# Patient Record
(demographics unavailable — no encounter records)

---

## 2024-10-18 NOTE — HISTORY OF PRESENT ILLNESS
[Radiating] : radiating [Intermittent] : intermittent [Meds] : meds [] : no [FreeTextEntry5] : 75 y/o F presents for FU eval of both knees today. States previous csi helped for a few weeks until this past weekend. She is considering Gel inj today.

## 2024-10-18 NOTE — HISTORY OF PRESENT ILLNESS
[Radiating] : radiating [Intermittent] : intermittent [Meds] : meds [] : no [FreeTextEntry5] : 73 y/o F presents for FU eval of both knees today. States previous csi helped for a few weeks until this past weekend. She is considering Gel inj today.

## 2024-10-18 NOTE — ASSESSMENT
[FreeTextEntry1] : The patient is here for bilateral knee pain follow up. The patient had 6 weeks of full relief. She had pain return without new trauma. The patient has increased pain with ADLs such as walking and using stairs. She has pain with increased activity as well. She feels it has slowed down her daily movement. She denies new trauma. She denies paresthesias. The patient has pain with standing and sitting for too long. The patient wishes to consider gel injections today.  B/L knee exam: Well appearing female in no apparent distress. No rashes, scars, or abrasions. Neurovascularly intact. Tender to palpation at the medial joint lines. Right knee has a flexion contracture. Ranging from 5-120 on left knee and  on the right knee. + right knee valgus deformity and left knee varus deformity. Anterior/posterior drawer negative, - Mcmurrays. - Lachmans. Screening exam of the bilateral hips shows a full, painless ROM.  Under sterile conditions the left knee was injected with 5 cc of quarter percent plain Marcaine; 5 cc of 2% plain lidocaine and 80 mg of Depo-Medrol.  Patient tolerated the procedure well.  Under sterile conditions the right knee was injected with 5 cc of quarter percent plain Marcaine; 5 cc of 2% plain lidocaine and 80 mg of Depo-Medrol.  Patient tolerated the procedure well.  The plan at this time is ice and activity modification.  When this round of cortisone shots wear off we will start a series of Euflexxa injections.  Her sister had a biological gel in the past and had an allergy to it so she would like to do the synthetic type.  I will see her back in the office as needed.

## 2024-11-06 NOTE — PHYSICAL EXAM
[Alert] : alert [Oriented x 3] : ~L oriented x 3 [Well Nourished] : well nourished [Full Body Skin Exam Performed] : performed [FreeTextEntry3] : A full skin exam was performed including the scalp, face (including the lips, ears, nose and eyes), neck, chest, breasts, abdomen, back, buttocks, upper extremities and lower extremities.  The patient declined examination of the genitalia.   The exam revealed the following benign growths: Seborrheic keratoses - includes right pre-auricular region and right upper breast. Lentigines. Cherry angioma.

## 2024-11-06 NOTE — HISTORY OF PRESENT ILLNESS
[FreeTextEntry1] : Patient presents for skin examination. [de-identified] : Denies new, changing, bleeding or tender lesions on the skin over the past year.

## 2024-11-25 NOTE — ASSESSMENT
[FreeTextEntry1] : The patient comes in today follow-up both of her knees.  She continues to have pain consistent with her osteoarthritis.  She has trouble walking distances, doing stairs and occasionally at night.  The cortisone shots have helped but she still has pain.  Examination both lower extremities reveal normal neurovascular exam.  Examination both knees with limited range of motion from 5 to 125 degrees with varus deformity.  There is medial joint line pain and trace effusions.  There is no Bebeto's sign or instability.  There is no redness, rashes or visible scars.  Under sterile conditions the left knee was injected with 20mg /2 cc Euflexxa.  They tolerated the procedure well.  Under sterile conditions the right knee was injected with 20mg /2 cc Euflexxa.  They tolerated the procedure well.  Plan at this time is ice and activity modification we will see her back next week for second Euflexxa injection.

## 2024-12-02 NOTE — ASSESSMENT
[FreeTextEntry1] : The patient comes in today for follow-up on both of her knees.  She is here for her second Euflexxa injections.  She continues have pain walking distances, doing stairs, and sleeping at night.  The first shot is not yet kicked in.  Examination of both lower extremities reveal normal neurovascular exam.  Examination of both knees with limited range of motion 5 to 115 degrees with varus deformity.  There is medial joint line pain.  There is no Bebeto's sign or instability.  Under sterile conditions the left knee was injected with 20mg /2 cc Euflexxa.  They tolerated the procedure well.  Under sterile conditions the right knee was injected with 20mg /2 cc Euflexxa.  They tolerated the procedure well.  Plan at this time is ice and activity modification and we will see her back in the office next week for her third and final Euflexxa injection.

## 2024-12-10 NOTE — ASSESSMENT
[FreeTextEntry1] : The patient comes in today follow-up both of her knees.  She is here for her third of a series of 3 Euflexxa injections.  She is getting some improvement in her pain.  She still has some difficulty walking distances, doing stairs and occasionally at night.  She struggles going from sitting to standing.  Examination of both lower extremities reveal a normal neurovascular exam.  Examination of both knees reveal full range of motion with pain at the extremes.  There is no effusion.  There is diffuse joint line pain with no Bebeto's sign or instability.  There is no redness, rashes or visible scars.  Under sterile conditions the left knee was injected with 20mg /2 cc Euflexxa.  They tolerated the procedure well.  Under sterile conditions the right knee was injected with 20mg /2 cc Euflexxa.  They tolerated the procedure well.  The plan at this time is ice and activity modification we will see her back in the office as needed.

## 2025-02-10 NOTE — HISTORY OF PRESENT ILLNESS
[FreeTextEntry1] : 74 year old woman with HLD and HTN.  History of DVT.  She is limited by knee pain, but has no complaints of chest discomfort or dyspnea.

## 2025-02-10 NOTE — DISCUSSION/SUMMARY
[FreeTextEntry1] : Patient with HLD.  Ischemic symptoms difficult to assess due to knee pain.  Coronary calcium study ordered. [EKG obtained to assist in diagnosis and management of assessed problem(s)] : EKG obtained to assist in diagnosis and management of assessed problem(s)

## 2025-02-10 NOTE — PHYSICAL EXAM

## 2025-03-08 NOTE — ASSESSMENT
[TextEntry] : Ms. HAMILTON WERNER is a 74-year-old here for right lower extremity phlebectomies and ultrasound guided sclerotherapy

## 2025-03-08 NOTE — PROCEDURE
[FreeTextEntry1] : right lower extremity phlebectomies and ultrasound guided sclerotherapy [FreeTextEntry2] : venous insufficiency [FreeTextEntry3] : Indication:  right lower extremity varicose veins with inflammation, leg pain, leg swelling, and leg cramping.   Assistant: ANURADHA Durand      Ms. HAMILTON WERNER is a 74 year old F with a history of symptomatic right lower extremity varicose veins. A trial of compression stockings, exercise, elevation, and pain medication was attempted without relief and definitive treatment with microphlebectomy was offered.  Patient agreed to the procedure.   Chart was reviewed, including ultrasound report and operative plan. Consent was obtained from the patient, risks and benefits of the procedure were explained. Prior to the start of the procedure, the patient was examined in the standing position. The skin was marked to identify superficial varicosities. The patient was then placed on the procedure table and the entire lower extremity was prepped and a sterile field using barriers was created.   Procedure:  right lower extremity ultrasound guided (25913) sclerotherapy, multiple veins (01787), Stab phlebectomy right lower extremity (04918)   The deep varicosities in the leg were treated with ultrasound-guided sclerotherapy. Ultrasound was used to identify the extent and distribution of the deep varicosities. Multiple varicosities were accessed with a 25G needle and injected with 1.0 ml of 1.0% Sodium Tetra-decyl sulphate mixed with 9 ml of normal saline.  The solution was injected and appropriate visualization of the solution going into the vein was achieved using direct ultrasound guidance. This was repeated at multiple sites.  Every injected area was immediately compressed with gauze.  Patient was kept in Trendelenburg position for 10 minutes and instructed to perform dorsi/plantar flexion of the foot.   Then local anesthesia using tumescent (50cc 1% lidocaine, 5mg sodium bicarbonate in 500cc of normal saline) was infiltrated using a 25-gauge needle over the previously marked prominent varicose vein sites.  The marked varicose veins were then extracted using a micro-incisional avulsion technique and separate stab incisions were made with a 16G Nokor Needle along the course of the varicosities. With the help of a phlebectomy hook, the vein was fished out at each of these sites, rolled over a narrow-tipped mosquito clamp and removed. Hemostasis was secured with leg elevation and application of manual pressure. After assuring hemostasis, a sterile 4x4 was placed on the access sites and an ACE compression wrap was applied.   SIDE - RIGHT  SITE - CALF  LOCATION - LATERAL / ANTERIOR / POSTERIOR   Total Stab Incisions: 22 Estimated Blood Loss: minimal   After assuring hemostasis, a sterile 4x4 was placed on the access site and an ACE compression wrap was applied. Patient tolerated procedure well. Patient was given post-procedure instructions and follow up appointment was scheduled.

## 2025-03-08 NOTE — PROCEDURE
[FreeTextEntry1] : right lower extremity phlebectomies and ultrasound guided sclerotherapy [FreeTextEntry2] : venous insufficiency [FreeTextEntry3] : Indication:  right lower extremity varicose veins with inflammation, leg pain, leg swelling, and leg cramping.   Assistant: ANURADHA Durand      Ms. HAMILTON WERNER is a 74 year old F with a history of symptomatic right lower extremity varicose veins. A trial of compression stockings, exercise, elevation, and pain medication was attempted without relief and definitive treatment with microphlebectomy was offered.  Patient agreed to the procedure.   Chart was reviewed, including ultrasound report and operative plan. Consent was obtained from the patient, risks and benefits of the procedure were explained. Prior to the start of the procedure, the patient was examined in the standing position. The skin was marked to identify superficial varicosities. The patient was then placed on the procedure table and the entire lower extremity was prepped and a sterile field using barriers was created.   Procedure:  right lower extremity ultrasound guided (91227) sclerotherapy, multiple veins (15214), Stab phlebectomy right lower extremity (63689)   The deep varicosities in the leg were treated with ultrasound-guided sclerotherapy. Ultrasound was used to identify the extent and distribution of the deep varicosities. Multiple varicosities were accessed with a 25G needle and injected with 1.0 ml of 1.0% Sodium Tetra-decyl sulphate mixed with 9 ml of normal saline.  The solution was injected and appropriate visualization of the solution going into the vein was achieved using direct ultrasound guidance. This was repeated at multiple sites.  Every injected area was immediately compressed with gauze.  Patient was kept in Trendelenburg position for 10 minutes and instructed to perform dorsi/plantar flexion of the foot.   Then local anesthesia using tumescent (50cc 1% lidocaine, 5mg sodium bicarbonate in 500cc of normal saline) was infiltrated using a 25-gauge needle over the previously marked prominent varicose vein sites.  The marked varicose veins were then extracted using a micro-incisional avulsion technique and separate stab incisions were made with a 16G Nokor Needle along the course of the varicosities. With the help of a phlebectomy hook, the vein was fished out at each of these sites, rolled over a narrow-tipped mosquito clamp and removed. Hemostasis was secured with leg elevation and application of manual pressure. After assuring hemostasis, a sterile 4x4 was placed on the access sites and an ACE compression wrap was applied.   SIDE - RIGHT  SITE - CALF  LOCATION - LATERAL / ANTERIOR / POSTERIOR   Total Stab Incisions: 22 Estimated Blood Loss: minimal   After assuring hemostasis, a sterile 4x4 was placed on the access site and an ACE compression wrap was applied. Patient tolerated procedure well. Patient was given post-procedure instructions and follow up appointment was scheduled.

## 2025-03-08 NOTE — REASON FOR VISIT
[Procedure: _________] : a [unfilled] procedure visit [FreeTextEntry1] : right lower extremity phlebectomies and ultrasound guided sclerotherapy

## 2025-03-13 NOTE — PHYSICAL EXAM
[de-identified] : Patient is well nourished, well-developed, in no acute distress, with appropriate mood and affect. The patient is oriented to time, place, and person. Gait evaluation does reveal a limp. There is no inguinal adenopathy. The bilateral limbs are well-perfused, without skin lesions, shows a grossly normal motor and sensory examination. Knee motion is significantly reduced and does cause significant pain. The right knee moves from 0-110 degrees. The knee is stable within that range-of-motion to AP and ML stress. The alignment of the knee is 5 deg valgus. Muscle strength is normal. Pedal pulses are palpable. Hip examination was negative. The left knee moves from 0-110  degrees. The knee is stable within that range-of-motion to AP and ML stress. The alignment of the knee is 5 deg varus. Muscle strength is normal. Pedal pulses are palpable. Hip examination was negative.  [de-identified] : Long standing knee, AP knee, lateral knee, and patellar views of the bilateral knee were ordered and taken in the office and demonstrate degenerative joint disease of the knee with joint space narrowing, osteophyte formation, and subchondral sclerosis.

## 2025-03-13 NOTE — HISTORY OF PRESENT ILLNESS
[de-identified] : This is very nice 74 year-old woman experiencing bilateral knee pain, which is severe in intensity. The pain substantially limits activities of daily living. Walking tolerance is reduced. Medication including HA and cortisone injections and activity modification have been minimally effective for a period lasting greater than three months in duration. Assistive devices and external support were not deemed by the patient to be helpful in improving their function. Due to the severity of osteoarthritis and level of pain, physical therapy is contraindicated. Pain and restriction of function are intolerable at this time.

## 2025-03-13 NOTE — PHYSICAL EXAM
[de-identified] : Patient is well nourished, well-developed, in no acute distress, with appropriate mood and affect. The patient is oriented to time, place, and person. Gait evaluation does reveal a limp. There is no inguinal adenopathy. The bilateral limbs are well-perfused, without skin lesions, shows a grossly normal motor and sensory examination. Knee motion is significantly reduced and does cause significant pain. The right knee moves from 0-110 degrees. The knee is stable within that range-of-motion to AP and ML stress. The alignment of the knee is 5 deg valgus. Muscle strength is normal. Pedal pulses are palpable. Hip examination was negative. The left knee moves from 0-110  degrees. The knee is stable within that range-of-motion to AP and ML stress. The alignment of the knee is 5 deg varus. Muscle strength is normal. Pedal pulses are palpable. Hip examination was negative.  [de-identified] : Long standing knee, AP knee, lateral knee, and patellar views of the bilateral knee were ordered and taken in the office and demonstrate degenerative joint disease of the knee with joint space narrowing, osteophyte formation, and subchondral sclerosis.

## 2025-03-13 NOTE — PHYSICAL EXAM
[de-identified] : Patient is well nourished, well-developed, in no acute distress, with appropriate mood and affect. The patient is oriented to time, place, and person. Gait evaluation does reveal a limp. There is no inguinal adenopathy. The bilateral limbs are well-perfused, without skin lesions, shows a grossly normal motor and sensory examination. Knee motion is significantly reduced and does cause significant pain. The right knee moves from 0-110 degrees. The knee is stable within that range-of-motion to AP and ML stress. The alignment of the knee is 5 deg valgus. Muscle strength is normal. Pedal pulses are palpable. Hip examination was negative. The left knee moves from 0-110  degrees. The knee is stable within that range-of-motion to AP and ML stress. The alignment of the knee is 5 deg varus. Muscle strength is normal. Pedal pulses are palpable. Hip examination was negative.  [de-identified] : Long standing knee, AP knee, lateral knee, and patellar views of the bilateral knee were ordered and taken in the office and demonstrate degenerative joint disease of the knee with joint space narrowing, osteophyte formation, and subchondral sclerosis.

## 2025-03-13 NOTE — HISTORY OF PRESENT ILLNESS
[de-identified] : This is very nice 74 year-old woman experiencing bilateral knee pain, which is severe in intensity. The pain substantially limits activities of daily living. Walking tolerance is reduced. Medication including HA and cortisone injections and activity modification have been minimally effective for a period lasting greater than three months in duration. Assistive devices and external support were not deemed by the patient to be helpful in improving their function. Due to the severity of osteoarthritis and level of pain, physical therapy is contraindicated. Pain and restriction of function are intolerable at this time.

## 2025-03-13 NOTE — HISTORY OF PRESENT ILLNESS
[de-identified] : This is very nice 74 year-old woman experiencing bilateral knee pain, which is severe in intensity. The pain substantially limits activities of daily living. Walking tolerance is reduced. Medication including HA and cortisone injections and activity modification have been minimally effective for a period lasting greater than three months in duration. Assistive devices and external support were not deemed by the patient to be helpful in improving their function. Due to the severity of osteoarthritis and level of pain, physical therapy is contraindicated. Pain and restriction of function are intolerable at this time.

## 2025-03-13 NOTE — DISCUSSION/SUMMARY
[de-identified] : This patient has severe bilateral knee osteoarthritis.  She has tried and failed a course of conservative management and would like to consider proceeding with a right total knee arthroplasty using robotic navigation for assistance.  After allowing a minimum of 3 months of healing she is then going to consider proceeding with a staged left total knee arthroplasty.  The patient is an appropriate candidate for consideration of right total knee replacement. An extensive discussion was conducted of the natural history of the disease and the variety of surgical and non-surgical treatment options available to the patient. A risk/benefit analysis was discussed with the patient reviewing the advantages and disadvantages of surgical intervention at this time. Both the level and length of the patient's pain have made additional conservative treatment measures consisting of NSAIDs, physical therapy, corticosteroids, and/or viscosupplementation contraindicated. A full explanation was given of the nature and the purpose of the procedure and anesthesia, its benefits, possible alternative methods of diagnosis or treatment, the risks involved, the possibility of complications, the foreseeable consequences of the procedure and the possible results of the non-treatment. I reviewed the plan of care as well as used a model of a total knee implant equivalent to the one that will be used for their total knee joint replacement. The ability to secure the implant utilizing cement or cementless (press-fit) was discussed with the patient. The patient agrees with the plan of care, as well as the use of implants for their total knee replacement.   We also discussed that if robotic/computer navigation is utilized, then additional incisions may need to be made to accommodate the computer navigation arrays, which will be placed in the femur and tibia.  This patient is taking narcotics preoperatively and understands our narcotic cessation policy and that continued preoperative usage of narcotics will make it more difficult to control postoperative pain. Further, the patient understands that the orthopedic literature demonstrates an increased risk to the patient of periprosthetic joint infection, dissatisfaction, chronic pain, and impaired overall outcome in patients who have preoperative exposure to narcotics prior to total joint replacement. The patient and I formulated a plan to decrease and/or cease narcotic use prior to surgery if possible in order to mitigate some of these risks.  No guarantee or assurance was made as to the results that may be obtained. Specifically, the risks were identified to include, but are not limited to the following: Infection, phlebitis, pulmonary embolism, death, paralysis, dislocation, pain, stiffness, instability, limp, weakness, breakage, leg-length inequality, uncontrolled bleeding, nerve injury, blood vessel injury, pressure sores, anesthetic risks, delayed healing of wound and bone, and wear and loosening. Further discussion was undertaken with the patient about the details of surgical preparation, treatment, and postoperative rehabilitation including medical clearance, autotransfusion, the hospital course, and the postoperative rehabilitation involved. All in all, I feel that this patient is a good candidate for surgical reconstruction.

## 2025-03-13 NOTE — DISCUSSION/SUMMARY
[de-identified] : This patient has severe bilateral knee osteoarthritis.  She has tried and failed a course of conservative management and would like to consider proceeding with a right total knee arthroplasty using robotic navigation for assistance.  After allowing a minimum of 3 months of healing she is then going to consider proceeding with a staged left total knee arthroplasty.  The patient is an appropriate candidate for consideration of right total knee replacement. An extensive discussion was conducted of the natural history of the disease and the variety of surgical and non-surgical treatment options available to the patient. A risk/benefit analysis was discussed with the patient reviewing the advantages and disadvantages of surgical intervention at this time. Both the level and length of the patient's pain have made additional conservative treatment measures consisting of NSAIDs, physical therapy, corticosteroids, and/or viscosupplementation contraindicated. A full explanation was given of the nature and the purpose of the procedure and anesthesia, its benefits, possible alternative methods of diagnosis or treatment, the risks involved, the possibility of complications, the foreseeable consequences of the procedure and the possible results of the non-treatment. I reviewed the plan of care as well as used a model of a total knee implant equivalent to the one that will be used for their total knee joint replacement. The ability to secure the implant utilizing cement or cementless (press-fit) was discussed with the patient. The patient agrees with the plan of care, as well as the use of implants for their total knee replacement.   We also discussed that if robotic/computer navigation is utilized, then additional incisions may need to be made to accommodate the computer navigation arrays, which will be placed in the femur and tibia.  This patient is taking narcotics preoperatively and understands our narcotic cessation policy and that continued preoperative usage of narcotics will make it more difficult to control postoperative pain. Further, the patient understands that the orthopedic literature demonstrates an increased risk to the patient of periprosthetic joint infection, dissatisfaction, chronic pain, and impaired overall outcome in patients who have preoperative exposure to narcotics prior to total joint replacement. The patient and I formulated a plan to decrease and/or cease narcotic use prior to surgery if possible in order to mitigate some of these risks.  No guarantee or assurance was made as to the results that may be obtained. Specifically, the risks were identified to include, but are not limited to the following: Infection, phlebitis, pulmonary embolism, death, paralysis, dislocation, pain, stiffness, instability, limp, weakness, breakage, leg-length inequality, uncontrolled bleeding, nerve injury, blood vessel injury, pressure sores, anesthetic risks, delayed healing of wound and bone, and wear and loosening. Further discussion was undertaken with the patient about the details of surgical preparation, treatment, and postoperative rehabilitation including medical clearance, autotransfusion, the hospital course, and the postoperative rehabilitation involved. All in all, I feel that this patient is a good candidate for surgical reconstruction.

## 2025-03-13 NOTE — DISCUSSION/SUMMARY
[de-identified] : This patient has severe bilateral knee osteoarthritis.  She has tried and failed a course of conservative management and would like to consider proceeding with a right total knee arthroplasty using robotic navigation for assistance.  After allowing a minimum of 3 months of healing she is then going to consider proceeding with a staged left total knee arthroplasty.  The patient is an appropriate candidate for consideration of right total knee replacement. An extensive discussion was conducted of the natural history of the disease and the variety of surgical and non-surgical treatment options available to the patient. A risk/benefit analysis was discussed with the patient reviewing the advantages and disadvantages of surgical intervention at this time. Both the level and length of the patient's pain have made additional conservative treatment measures consisting of NSAIDs, physical therapy, corticosteroids, and/or viscosupplementation contraindicated. A full explanation was given of the nature and the purpose of the procedure and anesthesia, its benefits, possible alternative methods of diagnosis or treatment, the risks involved, the possibility of complications, the foreseeable consequences of the procedure and the possible results of the non-treatment. I reviewed the plan of care as well as used a model of a total knee implant equivalent to the one that will be used for their total knee joint replacement. The ability to secure the implant utilizing cement or cementless (press-fit) was discussed with the patient. The patient agrees with the plan of care, as well as the use of implants for their total knee replacement.   We also discussed that if robotic/computer navigation is utilized, then additional incisions may need to be made to accommodate the computer navigation arrays, which will be placed in the femur and tibia.  This patient is taking narcotics preoperatively and understands our narcotic cessation policy and that continued preoperative usage of narcotics will make it more difficult to control postoperative pain. Further, the patient understands that the orthopedic literature demonstrates an increased risk to the patient of periprosthetic joint infection, dissatisfaction, chronic pain, and impaired overall outcome in patients who have preoperative exposure to narcotics prior to total joint replacement. The patient and I formulated a plan to decrease and/or cease narcotic use prior to surgery if possible in order to mitigate some of these risks.  No guarantee or assurance was made as to the results that may be obtained. Specifically, the risks were identified to include, but are not limited to the following: Infection, phlebitis, pulmonary embolism, death, paralysis, dislocation, pain, stiffness, instability, limp, weakness, breakage, leg-length inequality, uncontrolled bleeding, nerve injury, blood vessel injury, pressure sores, anesthetic risks, delayed healing of wound and bone, and wear and loosening. Further discussion was undertaken with the patient about the details of surgical preparation, treatment, and postoperative rehabilitation including medical clearance, autotransfusion, the hospital course, and the postoperative rehabilitation involved. All in all, I feel that this patient is a good candidate for surgical reconstruction.

## 2025-03-24 NOTE — HISTORY OF PRESENT ILLNESS
[FreeTextEntry1] : s/p right GSV RFA on 4/2/2024  Interval History: doing well still has varicose veins in the RLE lateral knee and behind knee; still mildly tender. had knee evaluated by orthopedics (family friend not official) was told she has possible OA and will need PT.   Interval History: here for episode of severe pain in her right leg and was seen in ER found to have phlebitis was treating herself with warm packs and NSAID's. of note does get injections to b/l knees for OA. no fever or chills symptoms improving. [de-identified] : Presenting s/p RLE phlebectomies and ultrasound guided sclerotherapy; she's doing well states her right leg swelling and feelings of heaviness has decreased. Endorsing some mild tenderness along phlebectomy sites.

## 2025-03-24 NOTE — ASSESSMENT
[FreeTextEntry1] : 73yo female longstanding venous insufficiency s/p right GSV Right leg SVT/phelbitis; now s/p R SAP, UGS. She is doing well and endorses improvement in her symptoms she offers no complaints no obvious phlebitis seen today.

## 2025-03-24 NOTE — PLAN
[TextEntry] : f/u 6 months continue active lifestyle with a lot of walking 20-30mmHg knee high compression prn

## 2025-03-24 NOTE — PHYSICAL EXAM
[2+] : left 2+ [Ankle Swelling (On Exam)] : present [Ankle Swelling Bilaterally] : bilaterally  [Ankle Swelling On The Right] : mild [No Rash or Lesion] : No rash or lesion [de-identified] : ambulating without assistance  [FreeTextEntry1] : B/L LE warm, well perfused no wounds R SAP sites bruised, healing well No obvious phlebitis  some bruising behind knee and residual reticular veins

## 2025-05-11 NOTE — PHYSICAL EXAM
[JVD] : no jugular venous distention  [Normal Breath Sounds] : Normal breath sounds [Normal Rate and Rhythm] : normal rate and rhythm [2+] : left 2+ [de-identified] : well appearing [de-identified] : wnl

## 2025-05-11 NOTE — ASSESSMENT
[FreeTextEntry1] : RLE VDX reviewed in office today with patient; R GSV not visualized from prior intervention. Previous phlebectomies all thrombosed no DVT/SVT.  -follow up in August with LLE VDX

## 2025-05-11 NOTE — HISTORY OF PRESENT ILLNESS
[FreeTextEntry1] : s/p right GSV RFA on 4/2/2024  Interval History: doing well still has varicose veins in the RLE lateral knee and behind knee; still mildly tender. had knee evaluated by orthopedics (family friend not official) was told she has possible OA and will need PT.  Interval History: here for episode of severe pain in her right leg and was seen in ER found to have phlebitis was treating herself with warm packs and NSAID's. of note does get injections to b/l knees for OA. no fever or chills symptoms improving.  Interval History: Presenting s/p RLE phlebectomies and ultrasound guided sclerotherapy; she's doing well states her right leg swelling and feelings of heaviness has decreased. Endorsing some mild tenderness along phlebectomy sites. [de-identified] : doing well planned for right knee replacement; phlebectomy sites c/d/i well healing; fell a few days ago but no residual complaints.